# Patient Record
Sex: FEMALE | Race: WHITE | NOT HISPANIC OR LATINO | Employment: FULL TIME | URBAN - METROPOLITAN AREA
[De-identification: names, ages, dates, MRNs, and addresses within clinical notes are randomized per-mention and may not be internally consistent; named-entity substitution may affect disease eponyms.]

---

## 2018-06-04 ENCOUNTER — OFFICE VISIT (OUTPATIENT)
Dept: ORTHOPEDIC SURGERY | Facility: CLINIC | Age: 48
End: 2018-06-04

## 2018-06-04 VITALS — BODY MASS INDEX: 35.82 KG/M2 | HEIGHT: 65 IN | WEIGHT: 215 LBS | HEART RATE: 88 BPM | OXYGEN SATURATION: 98 %

## 2018-06-04 DIAGNOSIS — T84.84XA PAINFUL ORTHOPAEDIC HARDWARE (HCC): ICD-10-CM

## 2018-06-04 DIAGNOSIS — I87.8 VENOUS STASIS: ICD-10-CM

## 2018-06-04 DIAGNOSIS — M25.572 LEFT ANKLE PAIN, UNSPECIFIED CHRONICITY: Primary | ICD-10-CM

## 2018-06-04 PROCEDURE — 99204 OFFICE O/P NEW MOD 45 MIN: CPT | Performed by: ORTHOPAEDIC SURGERY

## 2018-06-04 NOTE — PROGRESS NOTES
NEW PATIENT    Patient: Carole Dominguez  : 1970    Primary Care Provider: Max Barton MD    Requesting Provider: As above    Pain of the Left Ankle      History    Chief Complaint: Left ankle pain    History of Present Illness: This is an extremely pleasant 47-year-old woman who is here for second opinion regarding left ankle pain.  She had a fracture in 2016, treated with open reduction internal fixation in [place].  The fracture went on to heal, but she has had some persistent problems.  She is wondering if anything can be done to improve the pain, or if she just has to live with it.  The pain is predominantly medial, it's often a shooting pain along the distribution of the saphenous nerve.  It tends to be worse with activity, it's a little better with heat and ice.  Physical therapy was not recommended, she reports it's very stiff in the morning.  She does not wear support stockings, I encouraged her to try this because it can help.    No current outpatient prescriptions on file prior to visit.     No current facility-administered medications on file prior to visit.       Allergies   Allergen Reactions   • Morphine And Related Rash and GI Intolerance      Past Medical History:   Diagnosis Date   • No known health problems      Past Surgical History:   Procedure Laterality Date   • CHOLECYSTECTOMY     • HYSTERECTOMY     • ORIF ANKLE FRACTURE Left      Family History   Problem Relation Age of Onset   • Hypertension Mother    • Cancer Father    • Heart attack Father       Social History     Social History   • Marital status:      Spouse name: N/A   • Number of children: N/A   • Years of education: N/A     Occupational History   • Not on file.     Social History Main Topics   • Smoking status: Never Smoker   • Smokeless tobacco: Never Used   • Alcohol use No   • Drug use: No   • Sexual activity: Defer     Other Topics Concern   • Not on file     Social History Narrative   • No  "narrative on file        Review of Systems   Constitutional: Negative.    HENT: Negative.    Eyes: Negative.    Respiratory: Negative.    Cardiovascular: Negative.    Gastrointestinal: Negative.    Endocrine: Negative.    Genitourinary: Negative.    Musculoskeletal: Positive for arthralgias (ankle pain).   Skin: Negative.    Allergic/Immunologic: Negative.    Neurological: Negative.    Hematological: Negative.    Psychiatric/Behavioral: Negative.        The following portions of the patient's history were reviewed and updated as appropriate: allergies, current medications, past family history, past medical history, past social history, past surgical history and problem list.    Physical Exam:   Pulse 88   Ht 165.1 cm (65\")   Wt 97.5 kg (215 lb)   SpO2 98%   BMI 35.78 kg/m²   GENERAL: Body habitus: obese    Lower extremity edema: Left: 1+ pitting; Right: 1+ pitting    Varicose veins:  Left: mild; Right: mild    Gait: antalgic     Mental Status:  awake and alert; oriented to person, place, and time    Voice:  clear  SKIN:  Normal and warm and dry    Hair Growth:  Right:normal; Left:  normal  NAILS: Toenails: normal  HEENT: Head: Normocephalic, atraumatic,  without obvious abnormality.  eye: normal external eye, no icterus  ears: normal external ears  nose: normal external nose  pharynx: dental hygiene adequate  PULM:  Repiratory effort normal  CV:  Dorsalis Pedis:  Right: 2+; Left:2+    Posterior Tibial: Right:2+; Left:2+    Capillary Refill:  Brisk  MSK:  Hand:right handed and sensation intact      Tibia:  Right:  non tender; Left:  non tender      Ankle:  Right: non tender, ROM  normal and motor function  normal; Left:  Is exquisitely tender over the medial hardware, and the medial incision.  The medial incision is towards the anterior aspect of the medial malleolus and then curves a little bit posteriorly at the distal aspect.  Mildly positive Tinel's in the incision.  Nontender over the lateral hardware.  " Mildly tender across the anterior joint.  She has 10° of dorsiflexion, almost 40° of plantarflexion, 10° inversion eversion which is almost symmetric with the right.  Mild thickening of the ankle, no definite joint effusion.      Foot:  Right:  non tender; Left:  non tender      NEURO: Heel Walking:  Right:  normal; Left:  painful    Toe Walking:  Right:  normal; Left:  limited ability, painful     [place] 5.07 monofilament test: normal    Lower extremity sensation: intact     Reflexes:  Biceps:  Right:  1+; Left:  1+           Quads:  Right:  1+; Left:  1+           Ankle:  Right:  1+; Left:  1+      Calf Atrophy:Mild left calf atrophy    Motor Function: all 5/5         Medical Decision Making    Data Review:   ordered and reviewed x-rays today, reviewed radiology images, reviewed radiology results and reviewed outside records I reviewed her outside films and records    Assessment and Plan/ Diagnosis/Treatment options:   1. Left ankle pain, unspecified chronicity  Some of her pain is neurogenic, I explained that I do not think that can be improved with any specific surgery nor treatment.  She has a small anterior ankle osteophyte, she does have a little bit of arthritis.  I do think she would benefit from physical therapy, to regain proprioception.  Also an intra-articular steroid injection could be helpful, but I would hold off on that until after we remove the hardware.  I think she does have pain from the medial hardware.  She is exquisitely tender over the screws.  I explained that we generally do not make any specific plan to remove hardware, it's not necessary.  However when it's painful removal and help the pain.  I explained that it will not increase her range of motion, it doesn't do anything except help pain over the hardware.  Surgery also can make people worse, but it's not common.  I explained the small risk of refracture, I advise patients to avoid any type of impact sports for at least 3  months after surgery.  I explained I never talk anyone into surgery, but in her case I do think removing hardware will give her some improvement.  She would like to proceed.  We discussed the risks including but not limited to: death, infection, neurovascular damage, strokes, heart attacks, blood clots, chronic pain, deformity, stiffness, need for  further surgery,  amputation, etc.        - XR Ankle 2 View Left    2. Painful orthopaedic hardware  As above    3. Venous stasis  She has a little swelling bilaterally, I would recommend support stockings, we discussed what type and where to obtain them

## 2018-06-15 ENCOUNTER — APPOINTMENT (OUTPATIENT)
Dept: PREADMISSION TESTING | Facility: HOSPITAL | Age: 48
End: 2018-06-15

## 2018-06-15 DIAGNOSIS — T84.84XA PAINFUL ORTHOPAEDIC HARDWARE (HCC): ICD-10-CM

## 2018-06-15 DIAGNOSIS — I87.8 VENOUS STASIS: ICD-10-CM

## 2018-06-15 DIAGNOSIS — M25.572 LEFT ANKLE PAIN, UNSPECIFIED CHRONICITY: ICD-10-CM

## 2018-06-15 LAB
ANION GAP SERPL CALCULATED.3IONS-SCNC: 5 MMOL/L (ref 3–11)
BASOPHILS # BLD AUTO: 0.03 10*3/MM3 (ref 0–0.2)
BASOPHILS NFR BLD AUTO: 0.5 % (ref 0–1)
BUN BLD-MCNC: 17 MG/DL (ref 9–23)
BUN/CREAT SERPL: 21.8 (ref 7–25)
CALCIUM SPEC-SCNC: 9 MG/DL (ref 8.7–10.4)
CHLORIDE SERPL-SCNC: 106 MMOL/L (ref 99–109)
CO2 SERPL-SCNC: 30 MMOL/L (ref 20–31)
CREAT BLD-MCNC: 0.78 MG/DL (ref 0.6–1.3)
DEPRECATED RDW RBC AUTO: 42.2 FL (ref 37–54)
EOSINOPHIL # BLD AUTO: 0 10*3/MM3 (ref 0–0.3)
EOSINOPHIL NFR BLD AUTO: 0 % (ref 0–3)
ERYTHROCYTE [DISTWIDTH] IN BLOOD BY AUTOMATED COUNT: 14.1 % (ref 11.3–14.5)
GFR SERPL CREATININE-BSD FRML MDRD: 79 ML/MIN/1.73
GLUCOSE BLD-MCNC: 91 MG/DL (ref 70–100)
HBA1C MFR BLD: 5.6 % (ref 4.8–5.6)
HCT VFR BLD AUTO: 35.9 % (ref 34.5–44)
HGB BLD-MCNC: 11 G/DL (ref 11.5–15.5)
IMM GRANULOCYTES # BLD: 0.02 10*3/MM3 (ref 0–0.03)
IMM GRANULOCYTES NFR BLD: 0.3 % (ref 0–0.6)
LYMPHOCYTES # BLD AUTO: 1.05 10*3/MM3 (ref 0.6–4.8)
LYMPHOCYTES NFR BLD AUTO: 17.1 % (ref 24–44)
MCH RBC QN AUTO: 25.1 PG (ref 27–31)
MCHC RBC AUTO-ENTMCNC: 30.6 G/DL (ref 32–36)
MCV RBC AUTO: 82 FL (ref 80–99)
MONOCYTES # BLD AUTO: 0.44 10*3/MM3 (ref 0–1)
MONOCYTES NFR BLD AUTO: 7.2 % (ref 0–12)
NEUTROPHILS # BLD AUTO: 4.61 10*3/MM3 (ref 1.5–8.3)
NEUTROPHILS NFR BLD AUTO: 75.2 % (ref 41–71)
PLATELET # BLD AUTO: 219 10*3/MM3 (ref 150–450)
PMV BLD AUTO: 10 FL (ref 6–12)
POTASSIUM BLD-SCNC: 3.9 MMOL/L (ref 3.5–5.5)
RBC # BLD AUTO: 4.38 10*6/MM3 (ref 3.89–5.14)
SODIUM BLD-SCNC: 141 MMOL/L (ref 132–146)
WBC NRBC COR # BLD: 6.13 10*3/MM3 (ref 3.5–10.8)

## 2018-06-15 PROCEDURE — 80048 BASIC METABOLIC PNL TOTAL CA: CPT | Performed by: ORTHOPAEDIC SURGERY

## 2018-06-15 PROCEDURE — 93010 ELECTROCARDIOGRAM REPORT: CPT | Performed by: INTERNAL MEDICINE

## 2018-06-15 PROCEDURE — 83036 HEMOGLOBIN GLYCOSYLATED A1C: CPT | Performed by: ORTHOPAEDIC SURGERY

## 2018-06-15 PROCEDURE — 85025 COMPLETE CBC W/AUTO DIFF WBC: CPT | Performed by: ORTHOPAEDIC SURGERY

## 2018-06-15 PROCEDURE — 93005 ELECTROCARDIOGRAM TRACING: CPT

## 2018-06-15 PROCEDURE — 36415 COLL VENOUS BLD VENIPUNCTURE: CPT

## 2018-06-15 NOTE — DISCHARGE INSTRUCTIONS
The following information and instructions were given:    NPO after MN except sips of water with routine prescribed medication (except blood thinner, diabetes, or weight reducing medication) unless otherwise instructed by your physician.  Do not eat, drink, smoke or chew gum after MN the night before surgery. This also includes no mints.    EXCEPTION: Orthopedic ERAS patients Patient instructed to drink 20 ounces (or until full) of Gatorade or 20 ounces of G2 (if diabetic) and complete 3 hours before your surgery start time. (NO RED Gatorade or G2)    Patient verbalized understanding.      DO NOT shave for two days before your procedure.  Do not wear makeup.      DO NOT wear fingernail polish (gel/regular) and/or acrylic/artificial nails on the day of surgery.   If a patient had recent manicure and would rather not remove polish or artificial nails, then the minimum requirement is that the polish/artificial nails must be removed from the middle finger on each hand.      If patient was having surgery on an upper extremity, then the patient was instructed that fingernail polish/artificial fingernails must be removed for surgery.  NO EXCEPTIONS.      If patient was having surgery on a lower extremity, then the patient was instructed that toenail polish on both extremities must be removed for surgery.  NO EXCEPTIONS.    Remove all jewelry (advised to go to jeweler if unable to remove).  Jewelry especially rings can no longer be taped for surgery.    Leave anything you consider valuable at home.      Bring the following with you (if applicable)   -picture ID and insurance cards   -Co-pay/deductible required by insurance   -Medications in the original bottles (not a list) including all over-the-counter meds     Education booklet, brochure, and/or given to patient.    Patient must have a  for transportation home after procedure.  It must be an   adult that will take responsibility for care for 24 hours after  surgery.

## 2018-06-27 RX ORDER — HYDROCODONE BITARTRATE AND ACETAMINOPHEN 7.5; 325 MG/1; MG/1
1-2 TABLET ORAL EVERY 6 HOURS PRN
Qty: 60 TABLET | Refills: 0 | Status: SHIPPED | OUTPATIENT
Start: 2018-06-27 | End: 2018-06-27 | Stop reason: SDUPTHER

## 2018-06-27 RX ORDER — HYDROCODONE BITARTRATE AND ACETAMINOPHEN 7.5; 325 MG/1; MG/1
1-2 TABLET ORAL EVERY 6 HOURS PRN
Qty: 60 TABLET | Refills: 0 | Status: SHIPPED | OUTPATIENT
Start: 2018-06-27 | End: 2018-08-17

## 2018-06-27 RX ORDER — ONDANSETRON 4 MG/1
4 TABLET, FILM COATED ORAL EVERY 6 HOURS PRN
Qty: 30 TABLET | Refills: 0 | Status: SHIPPED | OUTPATIENT
Start: 2018-06-27 | End: 2018-08-17

## 2018-06-28 ENCOUNTER — OUTSIDE FACILITY SERVICE (OUTPATIENT)
Dept: ORTHOPEDIC SURGERY | Facility: CLINIC | Age: 48
End: 2018-06-28

## 2018-06-28 PROCEDURE — 20680 REMOVAL OF IMPLANT DEEP: CPT | Performed by: ORTHOPAEDIC SURGERY

## 2018-07-03 ENCOUNTER — TELEPHONE (OUTPATIENT)
Dept: ORTHOPEDIC SURGERY | Facility: CLINIC | Age: 48
End: 2018-07-03

## 2018-07-03 NOTE — TELEPHONE ENCOUNTER
PT HAD SURGERY LAST Thursday. PT WANTS TO KNOW WHEN SHE CAN TAKE A SHOWER. PLEASE CALL HER [phone].

## 2018-07-03 NOTE — TELEPHONE ENCOUNTER
Called patient back and let her know that it is ok to shower now as long she keeps the incision clean and dry until she comes in for her PO appt. She understood and will call with any further problems or questions.     Alie

## 2018-07-13 ENCOUNTER — OFFICE VISIT (OUTPATIENT)
Dept: ORTHOPEDIC SURGERY | Facility: CLINIC | Age: 48
End: 2018-07-13

## 2018-07-13 VITALS
HEART RATE: 87 BPM | HEIGHT: 65 IN | RESPIRATION RATE: 16 BRPM | WEIGHT: 205 LBS | OXYGEN SATURATION: 98 % | BODY MASS INDEX: 34.16 KG/M2

## 2018-07-13 DIAGNOSIS — Z98.890 S/P HARDWARE REMOVAL: Primary | ICD-10-CM

## 2018-07-13 PROCEDURE — 99024 POSTOP FOLLOW-UP VISIT: CPT | Performed by: PHYSICIAN ASSISTANT

## 2018-07-13 RX ORDER — TRAMADOL HYDROCHLORIDE 50 MG/1
50 TABLET ORAL EVERY 8 HOURS PRN
Qty: 20 TABLET | Refills: 0 | Status: CANCELLED | OUTPATIENT
Start: 2018-07-13

## 2018-07-13 NOTE — PROGRESS NOTES
[facility] [place] [facility] Note    Subjective     Patient: Carole Dominguez  : 1970    Primary Care Provider: Max Barton MD    Requesting Provider: As above    Follow-up of the Left Ankle (Status post  )      History        History of Present Illness: She returns today to weeks status post left medial ankle hardware removal 18.  She reports persisting pain but improving.  She is on her feet all day at work with increased pain.  She was wearing compression stockings prior to surgery.    Current Outpatient Prescriptions on File Prior to Visit   Medication Sig Dispense Refill   • diclofenac (VOLTAREN) 1 % gel gel Apply 4 g topically 4 (Four) Times a Day As Needed (pain in ankle). 5 tube 5   • HYDROcodone-acetaminophen (NORCO) 7.5-325 MG per tablet Take 1-2 tablets by mouth Every 6 Hours As Needed for Moderate Pain (as needed for post surgical pain). 60 tablet 0   • ondansetron (ZOFRAN) 4 MG tablet Take 1 tablet by mouth Every 6 (Six) Hours As Needed for Nausea or Vomiting. 30 tablet 0     No current facility-administered medications on file prior to visit.       Allergies   Allergen Reactions   • Morphine And Related Rash and GI Intolerance      Past Medical History:   Diagnosis Date   • No known health problems      Past Surgical History:   Procedure Laterality Date   • CHOLECYSTECTOMY     • HYSTERECTOMY     • ORIF ANKLE FRACTURE Left      Family History   Problem Relation Age of Onset   • Hypertension Mother    • Cancer Father    • Heart attack Father       Social History     Social History   • Marital status:      Spouse name: N/A   • Number of children: N/A   • Years of education: N/A     Occupational History   • Not on file.     Social History Main Topics   • Smoking status: Never Smoker   • Smokeless tobacco: Never Used   • Alcohol use No   • Drug use: No   • Sexual activity: Defer     Other Topics Concern   • Not on file     Social History Narrative   • No  "narrative on file        Review of Systems   Constitutional: Negative.    HENT: Negative.    Eyes: Negative.    Respiratory: Negative.    Cardiovascular: Negative.    Gastrointestinal: Negative.    Endocrine: Negative.    Genitourinary: Negative.    Musculoskeletal: Positive for arthralgias.   Skin: Negative.    Allergic/Immunologic: Negative.    Neurological: Negative.    Hematological: Negative.    Psychiatric/Behavioral: Negative.        The following portions of the patient's history were reviewed and updated as appropriate: allergies, current medications, past family history, past medical history, past social history, past surgical history and problem list.      Objective      Physical Exam  Pulse 87   Resp 16   Ht 165.1 cm (65\")   Wt 93 kg (205 lb)   SpO2 98%   BMI 34.11 kg/m²     Body mass index is 34.11 kg/m².    Patient is well nourished and well developed.      Ortho Exam  Left ankle exam:  Incision is healing well with no erythema warmth  Moderate postop swelling neurovascularly intact  Pulses 2+      Medical Decision Making    Data Review:   none    Assessment:  1. S/P hardware removal        Plan:  Doing well status post left medial ankle hardware removal.  Sutures removed Steri-Strips applied over incision today.  Patient reports she is having pain enough to require pain medicine sometimes.  She is requesting more.  I've given her a small number of tramadol to get her through the next couple of weeks.  I also encouraged her to wear compression stockings which will help with some of the pain and swelling.  She'll return to see Dr. Lazcano in 1 month or sooner if needed.      Shona Santana PA-C  07/13/18  2:13 PM    "

## 2018-08-17 ENCOUNTER — OFFICE VISIT (OUTPATIENT)
Dept: ORTHOPEDIC SURGERY | Facility: CLINIC | Age: 48
End: 2018-08-17

## 2018-08-17 DIAGNOSIS — S82.892S CLOSED FRACTURE OF LEFT ANKLE, SEQUELA: Primary | ICD-10-CM

## 2018-08-17 PROBLEM — S82.892A CLOSED FRACTURE OF LEFT ANKLE: Status: ACTIVE | Noted: 2018-08-17

## 2018-08-17 PROCEDURE — 99024 POSTOP FOLLOW-UP VISIT: CPT | Performed by: ORTHOPAEDIC SURGERY

## 2018-08-17 NOTE — PROGRESS NOTES
Post-op (7 weeks s/p (L) ankle medial hardware removal 06/28/2018)      Carole Dominguez is 7 weeks status post left ankle medial hardware removal, 6/28/18. She reports no fever, chills.  She reports pain is well controlled.  They have been taking aspirin for DVT prophylaxis.  They have been weight bearing as tolerated.      Past Surgical History:   Procedure Laterality Date   • CHOLECYSTECTOMY     • HYSTERECTOMY     • ORIF ANKLE FRACTURE Left        There were no vitals taken for this visit.        No erythema, no drainage, no sign of infection, normal post op swelling. Left ankle much less tender medially    none    Assessment and Plan:   1. Closed fracture of left ankle, sequela  Improved medial pain, but still has stiffness, soreness.  I would recommend PT now.  I will see her in 3 months, 2 views ankle  - Ambulatory Referral to Physical Therapy

## 2024-06-07 ENCOUNTER — TRANSCRIBE ORDERS (OUTPATIENT)
Dept: CT IMAGING | Facility: CLINIC | Age: 54
End: 2024-06-07
Payer: COMMERCIAL

## 2024-06-07 DIAGNOSIS — R10.84 GENERALIZED ABDOMINAL PAIN: Primary | ICD-10-CM
